# Patient Record
Sex: MALE | Race: WHITE | URBAN - METROPOLITAN AREA
[De-identification: names, ages, dates, MRNs, and addresses within clinical notes are randomized per-mention and may not be internally consistent; named-entity substitution may affect disease eponyms.]

---

## 2017-02-13 ENCOUNTER — TELEPHONE (OUTPATIENT)
Dept: FAMILY MEDICINE | Facility: CLINIC | Age: 27
End: 2017-02-13

## 2017-02-13 DIAGNOSIS — J45.40 MODERATE PERSISTENT ASTHMA: Primary | ICD-10-CM

## 2017-02-13 NOTE — TELEPHONE ENCOUNTER
Ventolin       Last Written Prescription Date: 09/24/2016  Last Fill Quantity: 3, # refills: 1    Last Office Visit with G, P or [facility] prescribing provider:  03/29/2016   Future Office Visit:       Date of Last Asthma Action Plan Letter:   Asthma Action Plan Q1 Year    Topic Date Due     Asthma Action Plan - yearly  07/07/2016      Asthma Control Test:   ACT Total Scores 3/29/2016   ACT TOTAL SCORE -   ASTHMA ER VISITS -   ASTHMA HOSPITALIZATIONS -   ACT TOTAL SCORE (Goal Greater than or Equal to 20) 15   In the past 12 months, how many times did you visit the emergency room for your asthma without being admitted to the hospital? 0   In the past 12 months, how many times were you hospitalized overnight because of your asthma? 0       Date of Last Spirometry Test:   No results found for this or any previous visit.      Waldemar HINSON (R)

## 2017-02-14 RX ORDER — ALBUTEROL SULFATE 90 UG/1
2 AEROSOL, METERED RESPIRATORY (INHALATION) EVERY 6 HOURS PRN
Qty: 1 INHALER | Refills: 0 | Status: SHIPPED | OUTPATIENT
Start: 2017-02-14 | End: 2017-02-19

## 2017-02-19 ENCOUNTER — OFFICE VISIT (OUTPATIENT)
Dept: URGENT CARE | Facility: URGENT CARE | Age: 27
End: 2017-02-19
Payer: COMMERCIAL

## 2017-02-19 VITALS
TEMPERATURE: 99.2 F | SYSTOLIC BLOOD PRESSURE: 122 MMHG | DIASTOLIC BLOOD PRESSURE: 74 MMHG | HEART RATE: 94 BPM | WEIGHT: 158.6 LBS | RESPIRATION RATE: 16 BRPM | OXYGEN SATURATION: 95 % | BODY MASS INDEX: 20.36 KG/M2

## 2017-02-19 DIAGNOSIS — J45.40 MODERATE PERSISTENT ASTHMA WITHOUT COMPLICATION: ICD-10-CM

## 2017-02-19 PROCEDURE — 99214 OFFICE O/P EST MOD 30 MIN: CPT | Performed by: PHYSICIAN ASSISTANT

## 2017-02-19 RX ORDER — ALBUTEROL SULFATE 90 UG/1
2 AEROSOL, METERED RESPIRATORY (INHALATION) EVERY 6 HOURS PRN
Qty: 1 INHALER | Refills: 3 | Status: SHIPPED | OUTPATIENT
Start: 2017-02-19 | End: 2017-09-08

## 2017-02-19 RX ORDER — PREDNISONE 20 MG/1
40 TABLET ORAL DAILY
Qty: 14 TABLET | Refills: 0 | Status: SHIPPED | OUTPATIENT
Start: 2017-02-19 | End: 2017-02-26

## 2017-02-19 ASSESSMENT — ENCOUNTER SYMPTOMS
SENSORY CHANGE: 0
FEVER: 0
LOSS OF CONSCIOUSNESS: 0
MYALGIAS: 0
NECK PAIN: 0
SEIZURES: 0
DIARRHEA: 0
SHORTNESS OF BREATH: 0
BACK PAIN: 0
HEARTBURN: 0
BLOOD IN STOOL: 0
HALLUCINATIONS: 0
NAUSEA: 0
DIZZINESS: 0
SPUTUM PRODUCTION: 0
BLURRED VISION: 0
TINGLING: 0
VOMITING: 0
CONSTIPATION: 0
FOCAL WEAKNESS: 0
NEUROLOGICAL NEGATIVE: 1
PHOTOPHOBIA: 0
WHEEZING: 1
INSOMNIA: 0
NERVOUS/ANXIOUS: 0
DEPRESSION: 0
ORTHOPNEA: 0
COUGH: 1
PALPITATIONS: 0
EYE DISCHARGE: 0
DYSURIA: 0
DIAPHORESIS: 0
DOUBLE VISION: 0
ABDOMINAL PAIN: 0
EYE REDNESS: 0
EYE PAIN: 0
HEADACHES: 0
WEAKNESS: 0
HEMOPTYSIS: 0
FREQUENCY: 0
WEIGHT LOSS: 0
SORE THROAT: 0

## 2017-02-19 ASSESSMENT — LIFESTYLE VARIABLES: SUBSTANCE_ABUSE: 0

## 2017-02-19 NOTE — PROGRESS NOTES
HPI    SUBJECTIVE:                                                    Saúl Cedillo is a 26 year old male who presents to clinic today for follow-up on his asthma. He has run out of his inhaler and has not been using a controlling inhaler for some time. He believes he developed a cold and that has triggered his asthma to worsen over the last few weeks. He's had no fever and has no sputum production.    Problem list and histories reviewed & adjusted, as indicated.  Additional history: as documented    Patient Active Problem List   Diagnosis     Moderate persistent asthma     Hyperlipidemia LDL goal <130     Exposure to tobacco smoke     Allergic rhinitis due to pollen     Past Surgical History   Procedure Laterality Date     Surgical history of -   2009     eye surgery, stigmatism correction       Social History   Substance Use Topics     Smoking status: Never Smoker     Smokeless tobacco: Never Used      Comment: living in smoke filled enviromnent     Alcohol use Yes      Comment: mixed 2 a month     History reviewed. No pertinent family history.      Current Outpatient Prescriptions   Medication Sig Dispense Refill     albuterol (PROAIR HFA/PROVENTIL HFA/VENTOLIN HFA) 108 (90 BASE) MCG/ACT Inhaler Inhale 2 puffs into the lungs every 6 hours as needed for shortness of breath / dyspnea or wheezing 1 Inhaler 3     fluticasone-vilanterol (BREO ELLIPTA) 200-25 MCG/INH oral inhaler Inhale 1 puff into the lungs daily 1 Inhaler 11     predniSONE (DELTASONE) 20 MG tablet Take 2 tablets (40 mg) by mouth daily for 7 days 14 tablet 0     loratadine-pseudoePHEDrine (CLARITIN-D 24-HOUR)  MG per tablet Take 1 tablet by mouth daily 14 tablet      [DISCONTINUED] albuterol (PROAIR HFA/PROVENTIL HFA/VENTOLIN HFA) 108 (90 BASE) MCG/ACT Inhaler Inhale 2 puffs into the lungs every 6 hours as needed for shortness of breath / dyspnea or wheezing (Patient not taking: Reported on 2/19/2017) 1 Inhaler 0     albuterol (2.5 MG/3ML)  0.083% nebulizer solution Take 1 vial (2.5 mg) by nebulization every 6 hours as needed for shortness of breath / dyspnea or wheezing (Patient not taking: Reported on 2/19/2017) 75 mL 0     fluticasone (FLOVENT HFA) 220 MCG/ACT inhaler Inhale 2 puffs into the lungs 2 times daily (Patient not taking: Reported on 2/19/2017) 1 Inhaler 1     No Known Allergies  Problem list, Medication list, Allergies, and Medical/Social/Surgical histories reviewed in [facility] and updated as appropriate.          Review of Systems   Constitutional: Negative for diaphoresis, fever, malaise/fatigue and weight loss.   HENT: Negative for congestion, ear discharge, ear pain, hearing loss, nosebleeds and sore throat.    Eyes: Negative for blurred vision, double vision, photophobia, pain, discharge and redness.   Respiratory: Positive for cough and wheezing. Negative for hemoptysis, sputum production and shortness of breath.    Cardiovascular: Negative for chest pain, palpitations, orthopnea and leg swelling.   Gastrointestinal: Negative for abdominal pain, blood in stool, constipation, diarrhea, heartburn, melena, nausea and vomiting.   Genitourinary: Negative.  Negative for dysuria, frequency and urgency.   Musculoskeletal: Negative for back pain, joint pain, myalgias and neck pain.   Skin: Negative for itching and rash.   Neurological: Negative.  Negative for dizziness, tingling, sensory change, focal weakness, seizures, loss of consciousness, weakness and headaches.   Endo/Heme/Allergies: Negative.    Psychiatric/Behavioral: Negative for depression, hallucinations, substance abuse and suicidal ideas. The patient is not nervous/anxious and does not have insomnia.          Physical Exam   Constitutional: He is oriented to person, place, and time and well-developed, well-nourished, and in no distress.   HENT:   Head: Normocephalic and atraumatic.   Right Ear: External ear normal.   Left Ear: External ear normal.   Nose: Nose normal.    Mouth/Throat: Oropharynx is clear and moist.   Eyes: Conjunctivae and EOM are normal. Pupils are equal, round, and reactive to light. Right eye exhibits no discharge. Left eye exhibits no discharge. No scleral icterus.   Neck: Normal range of motion. Neck supple. No thyromegaly present.   Cardiovascular: Normal rate, regular rhythm, normal heart sounds and intact distal pulses.  Exam reveals no gallop and no friction rub.    No murmur heard.  Pulmonary/Chest: Effort normal. No respiratory distress. He has wheezes. He has no rales. He exhibits no tenderness.   Abdominal: Soft. Bowel sounds are normal. He exhibits no distension and no mass. There is no tenderness. There is no rebound and no guarding.   Musculoskeletal: Normal range of motion. He exhibits no edema or tenderness.   Lymphadenopathy:     He has no cervical adenopathy.   Neurological: He is alert and oriented to person, place, and time. He has normal reflexes. No cranial nerve deficit. He exhibits normal muscle tone. Gait normal. Coordination normal.   Skin: Skin is warm and dry. No rash noted. No erythema.   Psychiatric: Mood, memory, affect and judgment normal.         (J45.40) Moderate persistent asthma without complication  Comment:   Plan: albuterol (PROAIR HFA/PROVENTIL HFA/VENTOLIN         HFA) 108 (90 BASE) MCG/ACT Inhaler,         fluticasone-vilanterol (BREO ELLIPTA) 200-25         MCG/INH oral inhaler, predniSONE (DELTASONE) 20        MG tablet           At this time I refill of his albuterol was given and a prescription for a controlling inhaler as well. Also, a short course of prednisone was prescribed.

## 2017-02-19 NOTE — MR AVS SNAPSHOT
"              After Visit Summary   2017    Saúl Cedillo    MRN: [id number]           Patient Information     Date Of Birth          1990        Visit Information        Provider Department      2017 2:00 PM Jakob Black PA-C [facility]        Today's Diagnoses     Moderate persistent asthma without complication           Follow-ups after your visit        Follow-up notes from your care team     Return if symptoms worsen or fail to improve.      Who to contact     If you have questions or need follow up information about today's clinic visit or your schedule please contact [facility] directly at [phone].  Normal or non-critical lab and imaging results will be communicated to you by MyChart, letter or phone within 4 business days after the clinic has received the results. If you do not hear from us within 7 days, please contact the clinic through [employer] or phone. If you have a critical or abnormal lab result, we will notify you by phone as soon as possible.  Submit refill requests through [employer] or call your pharmacy and they will forward the refill request to us. Please allow 3 business days for your refill to be completed.          Additional Information About Your Visit        MyChart Information     [employer] lets you send messages to your doctor, view your test results, renew your prescriptions, schedule appointments and more. To sign up, go to [email or web address] . Click on \"Log in\" on the left side of the screen, which will take you to the Welcome page. Then click on \"Sign up Now\" on the right side of the page.     You will be asked to enter the access code listed below, as well as some personal information. Please follow the directions to create your username and password.     Your access code is: [email or web address]  Expires: 2017  2:49 PM     Your access code will  in 90 days. If you need help or a new code, " please call your [place] clinic or [phone].        Care EveryWhere ID     This is your Care EveryWhere ID. This could be used by other organizations to access your [place] medical records  [id number]        Your Vitals Were     Pulse Temperature Respirations Pulse Oximetry BMI (Body Mass Index)       94 99.2  F (37.3  C) (Tympanic) 16 95% 20.36 kg/m2        Blood Pressure from Last 3 Encounters:   02/19/17 122/74   09/24/16 121/62   03/29/16 124/70    Weight from Last 3 Encounters:   02/19/17 158 lb 9.6 oz (71.9 kg)   03/29/16 149 lb (67.6 kg)   07/07/15 147 lb (66.7 kg)              Today, you had the following     No orders found for display         Today's Medication Changes          These changes are accurate as of: 2/19/17  2:49 PM.  If you have any questions, ask your nurse or doctor.               Start taking these medicines.        Dose/Directions    fluticasone-vilanterol 200-25 MCG/INH oral inhaler   Commonly known as:  BREO ELLIPTA   Used for:  Moderate persistent asthma without complication   Started by:  Jakob Black PA-C        Dose:  1 puff   Inhale 1 puff into the lungs daily   Quantity:  1 Inhaler   Refills:  11       predniSONE 20 MG tablet   Commonly known as:  DELTASONE   Used for:  Moderate persistent asthma without complication   Started by:  Jakob Black PA-C        Dose:  40 mg   Take 2 tablets (40 mg) by mouth daily for 7 days   Quantity:  14 tablet   Refills:  0            Where to get your medicines      These medications were sent to [facility] [address] [place] [address] - [address]   [address] [facility] [address]     Phone:  [phone]     albuterol 108 (90 BASE) MCG/ACT Inhaler    fluticasone-vilanterol 200-25 MCG/INH oral inhaler    predniSONE 20 MG tablet                Primary Care Provider Office Phone # Fax #    Yumi López [phone]       [facility] [address] [facility] [address]        Thank you!      Thank you for choosing [facility]  for your care. Our goal is always to provide you with excellent care. Hearing back from our patients is one way we can continue to improve our services. Please take a few minutes to complete the written survey that you may receive in the mail after your visit with us. Thank you!             Your Updated Medication List - Protect others around you: Learn how to safely use, store and throw away your medicines at www.disposemymeds.org.          This list is accurate as of: 2/19/17  2:49 PM.  Always use your most recent med list.                   Brand Name Dispense Instructions for use    * albuterol (2.5 MG/3ML) 0.083% neb solution     75 mL    Take 1 vial (2.5 mg) by nebulization every 6 hours as needed for shortness of breath / dyspnea or wheezing       * albuterol 108 (90 BASE) MCG/ACT Inhaler    PROAIR HFA/PROVENTIL HFA/VENTOLIN HFA    1 Inhaler    Inhale 2 puffs into the lungs every 6 hours as needed for shortness of breath / dyspnea or wheezing       fluticasone 220 MCG/ACT Inhaler    FLOVENT HFA    1 Inhaler    Inhale 2 puffs into the lungs 2 times daily       fluticasone-vilanterol 200-25 MCG/INH oral inhaler    BREO ELLIPTA    1 Inhaler    Inhale 1 puff into the lungs daily       loratadine-pseudoePHEDrine  MG per 24 hr tablet    CLARITIN-D 24-hour    14 tablet    Take 1 tablet by mouth daily       predniSONE 20 MG tablet    DELTASONE    14 tablet    Take 2 tablets (40 mg) by mouth daily for 7 days       * Notice:  This list has 2 medication(s) that are the same as other medications prescribed for you. Read the directions carefully, and ask your doctor or other care provider to review them with you.

## 2017-02-27 ENCOUNTER — TELEPHONE (OUTPATIENT)
Dept: FAMILY MEDICINE | Facility: CLINIC | Age: 27
End: 2017-02-27

## 2017-02-27 NOTE — TELEPHONE ENCOUNTER
Mailed patient the ACT on 2/2/17. Called patient today to go over the ACT results. Patient did not answer so I left a voicemail to call back. Will postpone for 1 week.    Verona Gr, CMA

## 2017-02-27 NOTE — LETTER
[facility]  [address] [address]  Phone: [phone]    April 3, 2017    Saúl Cedillo  [address]  [place] [address]              Dear Mr. Cedillo,    Your [place] Care Team works hard to make sure that you and your family receive exceptional care. Enclosed you will find a copy of the Asthma Control Test (ACT) that our clinic uses to monitor and manage your asthma. This test is an assessment tool that we use to determine how well your asthma is controlled.  Please keep a copy of the ACT for your reference when we call you to complete this assessment.   Please complete the enclosed ACT and return in the provided envelope, please keep the previously sent ACT as we may call to review with you in the future.      Sincerely,      Your [facility] Team

## 2017-03-06 NOTE — TELEPHONE ENCOUNTER
Pt was seen in UC on 2/19/17 for asthma related sx. Will postpone x 2 more weeks before contacting patient again.

## 2017-04-03 NOTE — TELEPHONE ENCOUNTER
Panel Management Review      Patient has the following on his problem list:     Asthma review     ACT Total Scores 2/19/2017   ACT TOTAL SCORE -   ASTHMA ER VISITS -   ASTHMA HOSPITALIZATIONS -   ACT TOTAL SCORE (Goal Greater than or Equal to 20) 10   In the past 12 months, how many times did you visit the emergency room for your asthma without being admitted to the hospital? -   In the past 12 months, how many times were you hospitalized overnight because of your asthma? -      1. Is Asthma diagnosis on the Problem List? Yes    2. Is Asthma listed on Health Maintenance? Yes    3. Patient is due for:  ACT and AAP      Composite cancer screening  Chart review shows that this patient is due/due soon for the following None  Summary:    Patient is due/failing the following:   ACT    Action needed:   Patient needs to do ACT.    Type of outreach:    No response to phone calls, letter with ACT mailed for patient to complete and return.  4/3/17    Questions for provider review:    None                                                                                                                                    SHARRI Rodas, CMA

## 2017-08-16 ENCOUNTER — TRANSFERRED RECORDS (OUTPATIENT)
Dept: HEALTH INFORMATION MANAGEMENT | Facility: CLINIC | Age: 27
End: 2017-08-16

## 2017-09-08 DIAGNOSIS — J45.40 MODERATE PERSISTENT ASTHMA WITHOUT COMPLICATION: ICD-10-CM

## 2017-09-08 NOTE — TELEPHONE ENCOUNTER
albuterol (PROAIR HFA/PROVENTIL HFA/VENTOLIN HFA) 108 (90 BASE) MCG/ACT Inhaler       Last Written Prescription Date: 02/19/2017  Last Fill Quantity: 1 inhaler, # refills: 3    Last Office Visit with [facility], P or [facility] prescribing provider:  02/19/2017    Future Office Visit:       Date of Last Asthma Action Plan Letter:   Asthma Action Plan Q1 Year    Topic Date Due     Asthma Action Plan - yearly  07/07/2016      Asthma Control Test:   ACT Total Scores 2/19/2017   ACT TOTAL SCORE -   ASTHMA ER VISITS -   ASTHMA HOSPITALIZATIONS -   ACT TOTAL SCORE (Goal Greater than or Equal to 20) 10   In the past 12 months, how many times did you visit the emergency room for your asthma without being admitted to the hospital? -   In the past 12 months, how many times were you hospitalized overnight because of your asthma? -       Date of Last Spirometry Test:   No results found for this or any previous visit.

## 2017-09-11 RX ORDER — ALBUTEROL SULFATE 90 UG/1
AEROSOL, METERED RESPIRATORY (INHALATION)
Qty: 1 INHALER | Refills: 0 | Status: SHIPPED | OUTPATIENT
Start: 2017-09-11 | End: 2018-01-10

## 2018-01-05 DIAGNOSIS — J45.40 MODERATE PERSISTENT ASTHMA WITHOUT COMPLICATION: ICD-10-CM

## 2018-01-05 RX ORDER — ALBUTEROL SULFATE 90 UG/1
AEROSOL, METERED RESPIRATORY (INHALATION)
Qty: 1 INHALER | Refills: 0 | Status: CANCELLED | OUTPATIENT
Start: 2018-01-05

## 2018-01-05 NOTE — TELEPHONE ENCOUNTER
Reason for Call:  Medication or medication refill:    Do you use a [facility]?  Name of the pharmacy and phone number for the current request:  [facility] [phone]    Name of the medication requested: ventolin inhaler - patient is requesting a refill to get him through until his appointment on Monday.    Other request: none    Can we leave a detailed message on this number? YES    Phone number patient can be reached at: Home number on file [phone] (home)    Best Time: any    Call taken on 1/5/2018 at 4:15 PM by Taylor Mcgarry

## 2018-01-09 NOTE — TELEPHONE ENCOUNTER
Phone answered to a busy signal  REcall    Patient is due for OV for refill on inhaler    Hina BORGES Rn

## 2018-01-10 ENCOUNTER — OFFICE VISIT (OUTPATIENT)
Dept: FAMILY MEDICINE | Facility: CLINIC | Age: 28
End: 2018-01-10
Payer: COMMERCIAL

## 2018-01-10 VITALS
WEIGHT: 154 LBS | TEMPERATURE: 97.7 F | DIASTOLIC BLOOD PRESSURE: 74 MMHG | BODY MASS INDEX: 19.77 KG/M2 | HEART RATE: 86 BPM | SYSTOLIC BLOOD PRESSURE: 122 MMHG | OXYGEN SATURATION: 96 %

## 2018-01-10 DIAGNOSIS — J45.40 MODERATE PERSISTENT ASTHMA WITHOUT COMPLICATION: Primary | ICD-10-CM

## 2018-01-10 PROCEDURE — 99213 OFFICE O/P EST LOW 20 MIN: CPT | Performed by: NURSE PRACTITIONER

## 2018-01-10 RX ORDER — ALBUTEROL SULFATE 90 UG/1
AEROSOL, METERED RESPIRATORY (INHALATION)
Qty: 1 INHALER | Refills: 3 | Status: SHIPPED | OUTPATIENT
Start: 2018-01-10 | End: 2018-09-06

## 2018-01-10 RX ORDER — ALBUTEROL SULFATE 0.83 MG/ML
1 SOLUTION RESPIRATORY (INHALATION) EVERY 6 HOURS PRN
Qty: 75 ML | Refills: 0 | Status: SHIPPED | OUTPATIENT
Start: 2018-01-10 | End: 2019-07-18

## 2018-01-10 ASSESSMENT — ASTHMA QUESTIONNAIRES
QUESTION_3 LAST FOUR WEEKS HOW OFTEN DID YOUR ASTHMA SYMPTOMS (WHEEZING, COUGHING, SHORTNESS OF BREATH, CHEST TIGHTNESS OR PAIN) WAKE YOU UP AT NIGHT OR EARLIER THAN USUAL IN THE MORNING: NOT AT ALL
QUESTION_4 LAST FOUR WEEKS HOW OFTEN HAVE YOU USED YOUR RESCUE INHALER OR NEBULIZER MEDICATION (SUCH AS ALBUTEROL): TWO OR THREE TIMES PER WEEK
QUESTION_2 LAST FOUR WEEKS HOW OFTEN HAVE YOU HAD SHORTNESS OF BREATH: ONCE OR TWICE A WEEK
QUESTION_1 LAST FOUR WEEKS HOW MUCH OF THE TIME DID YOUR ASTHMA KEEP YOU FROM GETTING AS MUCH DONE AT WORK, SCHOOL OR AT HOME: A LITTLE OF THE TIME
QUESTION_5 LAST FOUR WEEKS HOW WOULD YOU RATE YOUR ASTHMA CONTROL: SOMEWHAT CONTROLLED
ACT_TOTALSCORE: 19

## 2018-01-10 NOTE — MR AVS SNAPSHOT
After Visit Summary   1/10/2018    Saúl Cedillo    MRN: [id number]           Patient Information     Date Of Birth          1990        Visit Information        Provider Department      1/10/2018 6:40 AM Mame Madrigal APRN [facility]        Today's Diagnoses     Moderate persistent asthma without complication    -  1      Care Instructions          Thank you for choosing [facility].  You may be receiving a survey in the mail from [employer] regarding your visit today.  Please take a few minutes to complete and return the survey to let us know how we are doing.      If you have questions or concerns, please contact us via [employer] or you can contact your care team at [phone].    Our Clinic hours are:  Monday 6:40 am  to 7:00 pm  Tuesday -Friday 6:40 am to 5:00 pm    The [place] outpatient lab hours are:  Monday - Friday 6:10 am to 4:45 pm  Saturdays 7:00 am to 11:00 am  Appointments are required, call [phone]    If you have clinical questions after hours or would like to schedule an appointment,  call the clinic at [phone].          Follow-ups after your visit        Who to contact     If you have questions or need follow up information about today's clinic visit or your schedule please contact [facility] directly at [phone].  Normal or non-critical lab and imaging results will be communicated to you by B[employer]hart, letter or phone within 4 business days after the clinic has received the results. If you do not hear from us within 7 days, please contact the clinic through [employer] or phone. If you have a critical or abnormal lab result, we will notify you by phone as soon as possible.  Submit refill requests through [employer] or call your pharmacy and they will forward the refill request to us. Please allow 3 business days for your refill to be completed.          Additional Information About Your Visit        MyChart Information      "[employer] lets you send messages to your doctor, view your test results, renew your prescriptions, schedule appointments and more. To sign up, go to [email or web address] . Click on \"Log in\" on the left side of the screen, which will take you to the Welcome page. Then click on \"Sign up Now\" on the right side of the page.     You will be asked to enter the access code listed below, as well as some personal information. Please follow the directions to create your username and password.     Your access code is: [email or web address]  Expires: 4/10/2018  7:13 AM     Your access code will  in 90 days. If you need help or a new code, please call your [place] clinic or [phone].        Care EveryWhere ID     This is your Care EveryWhere ID. This could be used by other organizations to access your [place] medical records  [id number]        Your Vitals Were     Pulse Temperature Pulse Oximetry BMI (Body Mass Index)          86 97.7  F (36.5  C) (Tympanic) 96% 19.77 kg/m2         Blood Pressure from Last 3 Encounters:   01/10/18 122/74   17 122/74   16 121/62    Weight from Last 3 Encounters:   01/10/18 154 lb (69.9 kg)   17 158 lb 9.6 oz (71.9 kg)   16 149 lb (67.6 kg)              Today, you had the following     No orders found for display         Today's Medication Changes          These changes are accurate as of: 1/10/18  7:13 AM.  If you have any questions, ask your nurse or doctor.               These medicines have changed or have updated prescriptions.        Dose/Directions    * albuterol (2.5 MG/3ML) 0.083% neb solution   This may have changed:  Another medication with the same name was changed. Make sure you understand how and when to take each.   Used for:  Moderate persistent asthma without complication   Changed by:  Mame Madrigal APRN CNP        Dose:  1 vial   Take 1 vial (2.5 mg) by nebulization every 6 hours as needed for shortness of breath / dyspnea or wheezing "   Quantity:  75 mL   Refills:  0       * albuterol 108 (90 BASE) MCG/ACT Inhaler   Commonly known as:  VENTOLIN HFA   This may have changed:  See the new instructions.   Used for:  Moderate persistent asthma without complication   Changed by:  Mame Madrigal APRN CNP        INHALE TWO PUFFS INTO THE LUNGS EVERY 6 HOURS AS NEEDED FOR SHORTNESS OF BREATH/DYSPNEA OR WHEEZING   Quantity:  1 Inhaler   Refills:  3       * Notice:  This list has 2 medication(s) that are the same as other medications prescribed for you. Read the directions carefully, and ask your doctor or other care provider to review them with you.      Stop taking these medicines if you haven't already. Please contact your care team if you have questions.     fluticasone 220 MCG/ACT Inhaler   Commonly known as:  FLOVENT HFA   Stopped by:  Mame Madrigal APRN CNP                Where to get your medicines      These medications were sent to [facility] [address] [facility]  [address] [place] [address]     Phone:  [phone]     albuterol (2.5 MG/3ML) 0.083% neb solution    albuterol 108 (90 BASE) MCG/ACT Inhaler    fluticasone-vilanterol 200-25 MCG/INH oral inhaler                Primary Care Provider Office Phone # Fax #    Yumi Nicole López [phone] [address] [facility] [address]        Equal Access to Services     CHELSEA [facility] Hospital[facility] AH: Hadii aad ku hadasho Soomaali, waaxda luqadaha, qaybta kaalmada adeegyada, ludwin rodrigez hayac hargrove. So [facility] [phone].    ATENCIÓN: Si habla español, tiene a rivera disposición servicios gratuitos de asistencia lingüística. Nazia al [phone].    We comply with applicable federal civil rights laws and [place] laws. We do not discriminate on the basis of race, color, national origin, age, disability, sex, sexual orientation, or gender identity.            Thank you!     Thank you for choosing [facility]  for your  care. Our goal is always to provide you with excellent care. Hearing back from our patients is one way we can continue to improve our services. Please take a few minutes to complete the written survey that you may receive in the mail after your visit with us. Thank you!             Your Updated Medication List - Protect others around you: Learn how to safely use, store and throw away your medicines at www.disposemymeds.org.          This list is accurate as of: 1/10/18  7:13 AM.  Always use your most recent med list.                   Brand Name Dispense Instructions for use Diagnosis    * albuterol (2.5 MG/3ML) 0.083% neb solution     75 mL    Take 1 vial (2.5 mg) by nebulization every 6 hours as needed for shortness of breath / dyspnea or wheezing    Moderate persistent asthma without complication       * albuterol 108 (90 BASE) MCG/ACT Inhaler    VENTOLIN HFA    1 Inhaler    INHALE TWO PUFFS INTO THE LUNGS EVERY 6 HOURS AS NEEDED FOR SHORTNESS OF BREATH/DYSPNEA OR WHEEZING    Moderate persistent asthma without complication       fluticasone-vilanterol 200-25 MCG/INH oral inhaler    BREO ELLIPTA    1 Inhaler    Inhale 1 puff into the lungs daily    Moderate persistent asthma without complication       loratadine-pseudoePHEDrine  MG per 24 hr tablet    CLARITIN-D 24-hour    14 tablet    Take 1 tablet by mouth daily        * Notice:  This list has 2 medication(s) that are the same as other medications prescribed for you. Read the directions carefully, and ask your doctor or other care provider to review them with you.

## 2018-01-10 NOTE — NURSING NOTE
"Chief Complaint   Patient presents with     Asthma     inhaler refill       Initial /74 (BP Location: Left arm, Patient Position: Sitting, Cuff Size: Adult Regular)  Pulse 86  Temp 97.7  F (36.5  C) (Tympanic)  Wt 154 lb (69.9 kg)  SpO2 96%  BMI 19.77 kg/m2 Estimated body mass index is 19.77 kg/(m^2) as calculated from the following:    Height as of 3/29/16: 6' 2\" (1.88 m).    Weight as of this encounter: 154 lb (69.9 kg).  Medication Reconciliation: complete    "

## 2018-01-10 NOTE — PATIENT INSTRUCTIONS
Thank you for choosing [facility].  You may be receiving a survey in the mail from Jerzy Iyer regarding your visit today.  Please take a few minutes to complete and return the survey to let us know how we are doing.      If you have questions or concerns, please contact us via [employer] or you can contact your care team at [phone].    Our Clinic hours are:  Monday 6:40 am  to 7:00 pm  Tuesday -Friday 6:40 am to 5:00 pm    The [place] outpatient lab hours are:  Monday - Friday 6:10 am to 4:45 pm  Saturdays 7:00 am to 11:00 am  Appointments are required, call [phone]    If you have clinical questions after hours or would like to schedule an appointment,  call the clinic at [phone].

## 2018-01-10 NOTE — PROGRESS NOTES
SUBJECTIVE:   Saúl Cedillo is a 27 year old male who presents to clinic today for the following health issues:    Asthma Follow-Up    Was ACT completed today?    Yes    ACT Total Scores 1/10/2018   ACT TOTAL SCORE -   ASTHMA ER VISITS -   ASTHMA HOSPITALIZATIONS -   ACT TOTAL SCORE (Goal Greater than or Equal to 20) 19   In the past 12 months, how many times did you visit the emergency room for your asthma without being admitted to the hospital? 0   In the past 12 months, how many times were you hospitalized overnight because of your asthma? 0       Recent asthma triggers that patient is dealing with: dust mites    Amount of exercise or physical activity: 4-5 days/week for an average of 30-45 minutes    Problems taking medications regularly: No    Medication side effects: none  Diet: regular (no restrictions)  Patient states that he has not used in Breo Ellipta as he was not able to buy the prescription. He is waiting for new insurance.   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       -------------------------------------    Problem list and histories reviewed & adjusted, as indicated.  Additional history: as documented    Patient Active Problem List   Diagnosis     Moderate persistent asthma     Hyperlipidemia LDL goal <130     Exposure to tobacco smoke     Allergic rhinitis due to pollen     Past Surgical History:   Procedure Laterality Date     SURGICAL HISTORY OF -   2009    eye surgery, stigmatism correction       Social History   Substance Use Topics     Smoking status: Never Smoker     Smokeless tobacco: Never Used      Comment: living in smoke filled enviromnent     Alcohol use Yes      Comment: mixed 2 a month     History reviewed. No pertinent family history.          Reviewed and updated as needed this visit by clinical staffTobacco  Allergies  Med Hx  Surg Hx  Fam Hx  Soc Hx      Reviewed and updated as needed this visit by Provider         ROS:  Constitutional,  HEENT, cardiovascular, pulmonary, GI, , musculoskeletal, neuro, skin, endocrine and psych systems are negative, except as otherwise noted.      OBJECTIVE:   /74 (BP Location: Left arm, Patient Position: Sitting, Cuff Size: Adult Regular)  Pulse 86  Temp 97.7  F (36.5  C) (Tympanic)  Wt 154 lb (69.9 kg)  SpO2 96%  BMI 19.77 kg/m2  Body mass index is 19.77 kg/(m^2).  GENERAL: healthy, alert and no distress  RESP: lungs clear to auscultation - no rales, rhonchi or wheezes  CV: regular rate and rhythm, normal S1 S2, no S3 or S4, no murmur, click or rub,   MS: no gross musculoskeletal defects noted, no edema    Diagnostic Test Results:  none     ASSESSMENT/PLAN:       1. Moderate persistent asthma without complication  Uncontrolled currently- he is in process of getting insurance and has been without his Breo Ellipta inhaler  - he is working with pharmacy to get this medication   - using albuterol prn   - will need to redo ACT since he scored low today due to not taking Breo-Ellipta     PAT Denis [facility]

## 2018-01-11 ASSESSMENT — ASTHMA QUESTIONNAIRES: ACT_TOTALSCORE: 19

## 2018-03-19 ENCOUNTER — TELEPHONE (OUTPATIENT)
Dept: FAMILY MEDICINE | Facility: CLINIC | Age: 28
End: 2018-03-19

## 2018-03-19 NOTE — TELEPHONE ENCOUNTER
Patient is due to update ACT, last done 1/10/18 with a score of 19.  Moderate persistent asthma.    Per 1/10/18 office visit:    1. Moderate persistent asthma without complication  Uncontrolled currently- he is in process of getting insurance and has been without his Breo Ellipta inhaler  - he is working with pharmacy to get this medication   - using albuterol prn   - will need to redo ACT since he scored low today due to not taking Breo-Ellipta     Letter with ACT mailed to patient.  Will call to review.

## 2018-03-19 NOTE — LETTER
March 19, 2018      Saúl Cedillo  [address]  [place] [address]        Dear Saúl,     Your [facility] Team works hard to make sure that you and your family receive exceptional care. Enclosed you will find a copy of the Asthma Control Test (ACT) that our clinic uses to monitor and manage your asthma. This test is an assessment tool that we use to determine how well your asthma is controlled.  Please keep a copy of the ACT for your reference when we call you to complete this assessment.     We will call within the next 2 weeks to review the questionnaire.    Thank you for trusting us with your health care.        Sincerely,      Your [facility] Team/ramin

## 2018-03-19 NOTE — LETTER
April 4, 2018      Saúl Cedillo  [address]  [place] [address]        Dear Saúl,     We have been unable to reach you by phone Your [facility] Team works hard to make sure that you  receive exceptional care. Enclosed you will find a copy of the Asthma Control Test (ACT) that our clinic uses to monitor and manage your asthma. This test is an assessment tool that we can use to determine how well your asthma is controlled. Please fill out and mail back the enclosed ACT form. Enclosed is a stamped addressed envelope for you to mail the ACT back to the clinic in. Also, please  keep the ACT that was previously mailed to you  for your reference when we call you in the future  to complete this assessment.            Sincerely,        PAT Denis CNP/cb

## 2018-03-28 NOTE — TELEPHONE ENCOUNTER
Tried calling patient , no answer, voicemail box full. Will try again later. Brian BORGES CMA

## 2018-04-04 NOTE — TELEPHONE ENCOUNTER
Panel Management Review      Patient has the following on his problem list:     Asthma review     ACT Total Scores 1/10/2018   ACT TOTAL SCORE -   ASTHMA ER VISITS -   ASTHMA HOSPITALIZATIONS -   ACT TOTAL SCORE (Goal Greater than or Equal to 20) 19   In the past 12 months, how many times did you visit the emergency room for your asthma without being admitted to the hospital? 0   In the past 12 months, how many times were you hospitalized overnight because of your asthma? 0      1. Is Asthma diagnosis on the Problem List? Yes    2. Is Asthma listed on Health Maintenance? Yes    3. Patient is due for:  ACT      Composite cancer screening  Chart review shows that this patient is due/due soon for the following None  Summary:    Patient is due/failing the following:   ACT    Action needed:   Patient needs to do ACT.    Type of outreach:    Sent letter. with ACT to fill out and mail back     Questions for provider review:    None                                                                                                                                    Brian BORGES CMA

## 2018-07-03 ENCOUNTER — TELEPHONE (OUTPATIENT)
Dept: FAMILY MEDICINE | Facility: CLINIC | Age: 28
End: 2018-07-03

## 2018-07-03 NOTE — LETTER
July 24, 2018      Saúl Cedillo  [address]  [place] [address]        Dear Saúl,     Your [facility] Team works hard to make sure that you and your family receive exceptional care. Enclosed you will find a copy of the Asthma Control Test (ACT) that our clinic uses to monitor and manage your asthma. This test is an assessment tool that we use to determine how well your asthma is controlled.  Please complete the enclosed form and return in the provided envelope.    Thank you for trusting us with your health care.    Sincerely,        Your [facility] Team/ramni

## 2018-07-03 NOTE — LETTER
July 3, 2018      Saúl Cedillo  [address]  [place] [address]        Dear Saúl,     Your [facility] Team works hard to make sure that you and your family receive exceptional care. Enclosed you will find a copy of the Asthma Control Test (ACT) that our clinic uses to monitor and manage your asthma. This test is an assessment tool that we use to determine how well your asthma is controlled.  Please keep a copy of the ACT for your reference when we call you to complete this assessment.     We will call within the next 2 weeks to review the questionnaire.    Thank you for trusting us with your health care.    Sincerely,        Your [facility] Team/ramin

## 2018-07-03 NOTE — TELEPHONE ENCOUNTER
Patient is due to update ACT, last done 1/10/18 with a score of 19.  Moderate persistent asthma.     Per 1/10/18 office visit:     1. Moderate persistent asthma without complication  Uncontrolled currently- he is in process of getting insurance and has been without his Breo Ellipta inhaler  - he is working with pharmacy to get this medication   - using albuterol prn   - will need to redo ACT since he scored low today due to not taking Breo-Ellipta     Last panel management 3/19/18.     Letter with ACT mailed to patient.  Will call to review.

## 2018-07-24 NOTE — TELEPHONE ENCOUNTER
Panel Management Review      Patient has the following on his problem list:     Asthma review     ACT Total Scores 1/10/2018   ACT TOTAL SCORE -   ASTHMA ER VISITS -   ASTHMA HOSPITALIZATIONS -   ACT TOTAL SCORE (Goal Greater than or Equal to 20) 19   In the past 12 months, how many times did you visit the emergency room for your asthma without being admitted to the hospital? 0   In the past 12 months, how many times were you hospitalized overnight because of your asthma? 0      1. Is Asthma diagnosis on the Problem List? Yes    2. Is Asthma listed on Health Maintenance? Yes    3. Patient is due for:  ACT      Composite cancer screening  Chart review shows that this patient is due/due soon for the following None  Summary:    Patient is due/failing the following:   ACT    Action needed:   Patient needs to do ACT.    Type of outreach:    No response to phone calls, letter mailed with ACT for patient to complete and return.    Questions for provider review:    None                                                                                                                                    SHARRI Rodas MA

## 2018-09-06 DIAGNOSIS — J45.40 MODERATE PERSISTENT ASTHMA WITHOUT COMPLICATION: ICD-10-CM

## 2018-09-06 NOTE — LETTER
Saúl Cedillo  [address] [facility] [address]          Dear Saúl,      We refilled the inhalers for you but you will need an office visit with your provider for further refills.  Please call [phone] to set this up soon.      Thank you,          Your care team, Chana ARIZMENDI RN

## 2018-09-07 RX ORDER — ALBUTEROL SULFATE 90 UG/1
AEROSOL, METERED RESPIRATORY (INHALATION)
Qty: 1 INHALER | Refills: 3 | Status: SHIPPED | OUTPATIENT
Start: 2018-09-07 | End: 2018-09-28

## 2018-09-07 NOTE — TELEPHONE ENCOUNTER
"Due for clinic visit. 30 day jules fill given.    Attempted to contact patient. No answer/voicemail full.      Rosina SUN RN      Requested Prescriptions   Pending Prescriptions Disp Refills     fluticasone-vilanterol (BREO ELLIPTA) 200-25 MCG/INH inhaler 1 Inhaler 3     Sig: Inhale 1 puff into the lungs daily    Inhaled Steroids Protocol Failed    9/6/2018  8:29 PM       Failed - Asthma control assessment score within normal limits in last 6 months    Please review ACT score.          Failed - Recent (6 mo) or future (30 days) visit within the authorizing provider's specialty    Patient had office visit in the last 6 months or has a visit in the next 30 days with authorizing provider or within the authorizing provider's specialty.  See \"Patient Info\" tab in inbasket, or \"Choose Columns\" in Meds & Orders section of the refill encounter.           Passed - Patient is age 12 or older        albuterol (VENTOLIN HFA) 108 (90 Base) MCG/ACT inhaler 1 Inhaler 3     Sig: INHALE TWO PUFFS INTO THE LUNGS EVERY 6 HOURS AS NEEDED FOR SHORTNESS OF BREATH/DYSPNEA OR WHEEZING    Asthma Maintenance Inhalers - Anticholinergics Failed    9/6/2018  8:29 PM       Failed - Asthma control assessment score within normal limits in last 6 months    Please review ACT score.          Failed - Recent (6 mo) or future (30 days) visit within the authorizing provider's specialty    Patient had office visit in the last 6 months or has a visit in the next 30 days with authorizing provider or within the authorizing provider's specialty.  See \"Patient Info\" tab in inbasket, or \"Choose Columns\" in Meds & Orders section of the refill encounter.           Passed - Patient is age 12 years or older          "

## 2018-09-10 NOTE — TELEPHONE ENCOUNTER
I have attempted to contact this patient by phone with the following results: 'the mailbox is full and cannot accept any messages.'    Katherine HERNANDEZ RN

## 2018-09-11 NOTE — TELEPHONE ENCOUNTER
Attempted to call the patient and mailbox is still full.  I have checked with pharmacy and the patient did  the inhalers  I have mailed a letter asking for him to schedule an appt soon. Chana ARIZMENDI RN

## 2018-09-28 ENCOUNTER — OFFICE VISIT (OUTPATIENT)
Dept: FAMILY MEDICINE | Facility: CLINIC | Age: 28
End: 2018-09-28
Payer: COMMERCIAL

## 2018-09-28 VITALS
TEMPERATURE: 97.4 F | DIASTOLIC BLOOD PRESSURE: 70 MMHG | OXYGEN SATURATION: 99 % | SYSTOLIC BLOOD PRESSURE: 102 MMHG | BODY MASS INDEX: 19.92 KG/M2 | HEIGHT: 73 IN | HEART RATE: 85 BPM | WEIGHT: 150.3 LBS

## 2018-09-28 DIAGNOSIS — R53.83 FATIGUE, UNSPECIFIED TYPE: ICD-10-CM

## 2018-09-28 DIAGNOSIS — J02.9 ACUTE PHARYNGITIS, UNSPECIFIED ETIOLOGY: Primary | ICD-10-CM

## 2018-09-28 DIAGNOSIS — T14.8XXA ABRASION OF SKIN WITH INFECTION: ICD-10-CM

## 2018-09-28 DIAGNOSIS — R19.7 DIARRHEA OF PRESUMED INFECTIOUS ORIGIN: ICD-10-CM

## 2018-09-28 DIAGNOSIS — L08.9 ABRASION OF SKIN WITH INFECTION: ICD-10-CM

## 2018-09-28 DIAGNOSIS — J45.40 MODERATE PERSISTENT ASTHMA WITHOUT COMPLICATION: ICD-10-CM

## 2018-09-28 DIAGNOSIS — R10.84 ABDOMINAL PAIN, GENERALIZED: ICD-10-CM

## 2018-09-28 LAB
DEPRECATED S PYO AG THROAT QL EIA: NORMAL
SPECIMEN SOURCE: NORMAL

## 2018-09-28 PROCEDURE — 87081 CULTURE SCREEN ONLY: CPT | Performed by: NURSE PRACTITIONER

## 2018-09-28 PROCEDURE — 99214 OFFICE O/P EST MOD 30 MIN: CPT | Performed by: NURSE PRACTITIONER

## 2018-09-28 PROCEDURE — 87880 STREP A ASSAY W/OPTIC: CPT | Performed by: NURSE PRACTITIONER

## 2018-09-28 RX ORDER — MUPIROCIN 20 MG/G
OINTMENT TOPICAL 3 TIMES DAILY
Qty: 30 G | Refills: 0 | Status: SHIPPED | OUTPATIENT
Start: 2018-09-28 | End: 2018-10-05

## 2018-09-28 RX ORDER — ONDANSETRON 4 MG/1
4 TABLET, FILM COATED ORAL EVERY 8 HOURS PRN
Qty: 15 TABLET | Refills: 1 | Status: SHIPPED | OUTPATIENT
Start: 2018-09-28

## 2018-09-28 RX ORDER — ALBUTEROL SULFATE 90 UG/1
AEROSOL, METERED RESPIRATORY (INHALATION)
Qty: 1 INHALER | Refills: 3 | Status: SHIPPED | OUTPATIENT
Start: 2018-09-28 | End: 2019-03-21

## 2018-09-28 NOTE — MR AVS SNAPSHOT
After Visit Summary   9/28/2018    Saúl Cedillo    MRN: [id number]           Patient Information     Date Of Birth          1990        Visit Information        Provider Department      9/28/2018 2:20 PM Carol Morrell APRN CNP [facility]        Today's Diagnoses     Acute pharyngitis, unspecified etiology    -  1    Fatigue, unspecified type        Diarrhea of presumed infectious origin        Moderate persistent asthma without complication        Abrasion of skin with infection        Abdominal pain, generalized          Care Instructions    For skin sores apply Bactroban cream 3 times daily for 7 days, may repeat treatment as needed.  Strep test is negative  Abdominal cramping, diarrhea and nausea due to viral gastroenteritis  Viral infection can cause fatigue.    Recommend symptomatic treatment with Imodium 2 mg as needed after each loose stool, maximum 16 mg per day  Drink more fluids    Zofran as needed for nausea    BRAT diet: bananas, rice, fluids, toasts and apple sauce.     If symptoms don't improve recommend additional evaluation stool testing, liver function check, CBC, renal function    If you continue to have symptoms without improvement schedule lab appointment                  Follow-ups after your visit        Who to contact     If you have questions or need follow up information about today's clinic visit or your schedule please contact [facility] directly at [phone].  Normal or non-critical lab and imaging results will be communicated to you by MyChart, letter or phone within 4 business days after the clinic has received the results. If you do not hear from us within 7 days, please contact the clinic through MyChart or phone. If you have a critical or abnormal lab result, we will notify you by phone as soon as possible.  Submit refill requests through [employer] or call your pharmacy and they will forward the refill request to us.  "Please allow 3 business days for your refill to be completed.          Additional Information About Your Visit        MyChart Information     [employer] lets you send messages to your doctor, view your test results, renew your prescriptions, schedule appointments and more. To sign up, go to [email or web address] . Click on \"Log in\" on the left side of the screen, which will take you to the Welcome page. Then click on \"Sign up Now\" on the right side of the page.     You will be asked to enter the access code listed below, as well as some personal information. Please follow the directions to create your username and password.     Your access code is: [email or web address]  Expires: 2018  2:47 PM     Your access code will  in 90 days. If you need help or a new code, please call your [facility] clinic or [phone].        Care EveryWhere ID     This is your Care EveryWhere ID. This could be used by other organizations to access your [facility] medical records  [id number]        Your Vitals Were     Pulse Temperature Height Pulse Oximetry BMI (Body Mass Index)       85 97.4  F (36.3  C) (Tympanic) 6' 1.25\" (1.861 m) 99% 19.69 kg/m2        Blood Pressure from Last 3 Encounters:   18 102/70   01/10/18 122/74   17 122/74    Weight from Last 3 Encounters:   18 150 lb 4.8 oz (68.2 kg)   01/10/18 154 lb (69.9 kg)   17 158 lb 9.6 oz (71.9 kg)              We Performed the Following     Beta strep group A culture     Rapid strep screen          Today's Medication Changes          These changes are accurate as of 18  2:47 PM.  If you have any questions, ask your nurse or doctor.               Start taking these medicines.        Dose/Directions    mupirocin 2 % ointment   Commonly known as:  BACTROBAN   Used for:  Abrasion of skin with infection   Started by:  Carol Morrell APRN CNP        Apply topically 3 times daily for 7 days To affected areas of your skin   Quantity:  30 g   Refills: "  0            Where to get your medicines      These medications were sent to [facility] [place] - [place], [place] - [address]  [address] [address]     Phone:  [phone]     albuterol 108 (90 Base) MCG/ACT inhaler    fluticasone-vilanterol 200-25 MCG/INH inhaler    mupirocin 2 % ointment                Primary Care Provider Office Phone # Fax #    Yumi Nicole López [phone] [address] [facility] [address]        Equal Access to Services     : Hadii aad ku hadasho Soomaali, waaxda luqadaha, qaybta kaalmada adeegyada, ludwin boles . [facility] [place] -[phone].    ATENCIÓN: Si habla español, tiene a rivera disposición servicios gratuitos de asistencia lingüística. [facility] [phone].    We comply with applicable federal civil rights laws and [place] laws. We do not discriminate on the basis of race, color, national origin, age, disability, sex, sexual orientation, or gender identity.            Thank you!     Thank you for choosing [facility]  for your care. Our goal is always to provide you with excellent care. Hearing back from our patients is one way we can continue to improve our services. Please take a few minutes to complete the written survey that you may receive in the mail after your visit with us. Thank you!             Your Updated Medication List - Protect others around you: Learn how to safely use, store and throw away your medicines at www.disposemymeds.org.          This list is accurate as of 9/28/18  2:47 PM.  Always use your most recent med list.                   Brand Name Dispense Instructions for use Diagnosis    * albuterol (2.5 MG/3ML) 0.083% neb solution     75 mL    Take 1 vial (2.5 mg) by nebulization every 6 hours as needed for shortness of breath / dyspnea or wheezing    Moderate persistent asthma without complication       * albuterol 108 (90 Base) MCG/ACT inhaler    VENTOLIN  HFA    1 Inhaler    INHALE TWO PUFFS INTO THE LUNGS EVERY 6 HOURS AS NEEDED FOR SHORTNESS OF BREATH/DYSPNEA OR WHEEZING    Moderate persistent asthma without complication       fluticasone-vilanterol 200-25 MCG/INH inhaler    BREO ELLIPTA    1 Inhaler    Inhale 1 puff into the lungs daily    Moderate persistent asthma without complication       loratadine-pseudoePHEDrine  MG per 24 hr tablet    CLARITIN-D 24-hour    14 tablet    Take 1 tablet by mouth daily        mupirocin 2 % ointment    BACTROBAN    30 g    Apply topically 3 times daily for 7 days To affected areas of your skin    Abrasion of skin with infection       * Notice:  This list has 2 medication(s) that are the same as other medications prescribed for you. Read the directions carefully, and ask your doctor or other care provider to review them with you.

## 2018-09-28 NOTE — PROGRESS NOTES
SUBJECTIVE:   Saúl Cedillo is a 28 year old male who presents to clinic today for the following health issues: asthma follow up and several other complains. States asthma symptoms under control, rarely using rescue inhaler, denies shortness of breath and wheezing, complains of mild cough that started about a week ago. Also, complains of generalized abdominal cramping pain, nausea, bloating, diarrhea started this morning, feeling feverish, chills, sweats and fatigue. Also sore throat for about a week.    Complains of multiple small skin abrasions on his hands and arms with redness and states he noted some pussy discharge. 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     Chief Complaint   Patient presents with     Abdominal Pain     xone day      Refill Request     inhalers, pending      Fatigue     Has been feeling fatigued the past two weeks     Throat Problem     Has had a sore throat for one week        ABDOMINAL PAIN     Onset: one day     Description:   Character: Sharp  Location: right upper quadrant left upper quadrant  Radiation: None    Intensity: 3/10    Progression of Symptoms:  improving    Accompanying Signs & Symptoms:  Fever/Chills?: YES, chills   Gas/Bloating: YES, gas   Nausea: YES   Vomitting: no   Diarrhea?: YES- started this morning   Constipation:no   Dysuria or Hematuria: no    History:   Trauma: no   Previous similar pain: States he has had stomach cramps in the past   Previous tests done: none    Precipitating factors:    Does the pain change with:     Food: no      BM: no     Urination: no     Alleviating factors:  None     Therapies Tried and outcome:  Aleve       LMP:  not applicable     Problem list and histories reviewed & adjusted, as indicated.  Additional history: as documented    Labs reviewed in EPIC    Reviewed and updated as needed this visit by clinical staff  Tobacco  Allergies  Meds  Med Hx  Surg Hx  Fam Hx  Soc Hx      Reviewed and  "updated as needed this visit by Provider         ROS:  Constitutional, HEENT, cardiovascular, pulmonary, gi and gu systems are negative, except as otherwise noted.    OBJECTIVE:     /70  Pulse 85  Temp 97.4  F (36.3  C) (Tympanic)  Ht 6' 1.25\" (1.861 m)  Wt 150 lb 4.8 oz (68.2 kg)  SpO2 99%  BMI 19.69 kg/m2  Body mass index is 19.69 kg/(m^2).  GENERAL: healthy, alert and no distress  EYES: Eyes grossly normal to inspection, PERRL and conjunctivae and sclerae normal  HENT: normal cephalic/atraumatic, right ear: erythematous, left ear: normal TM mobility on insufflation, nose and mouth without ulcers or lesions, oral mucous membranes moist and tonsillar erythema  NECK: bilateral anterior cervical adenopathy  RESP:occasional soft expiratory wheezes bilaterally   CV: regular rate and rhythm, normal S1 S2, no S3 or S4, no murmur, click or rub, no peripheral edema and peripheral pulses strong  ABDOMEN: soft, nontender, no organomegaly or masses and bowel sounds hyperactive  MS: no gross musculoskeletal defects noted, no edema  PSYCH: mentation appears normal, affect normal/bright    Diagnostic Test Results:  Results for orders placed or performed in visit on 09/28/18 (from the past 24 hour(s))   Rapid strep screen   Result Value Ref Range    Specimen Description Throat     Rapid Strep A Screen       NEGATIVE: No Group A streptococcal antigen detected by immunoassay, await culture report.       ASSESSMENT/PLAN:     1. Acute pharyngitis, unspecified etiology  -discussed symptomatic treatment   - Rapid strep screen  - Beta strep group A culture    2. Fatigue, unspecified type  -more likely related to current viral illness, gastroenteritis  -if no improvement in 3-5 days recommended additional evaluation: CBC, LFT's, BMP and stool testing   - CBC with platelets differential; Future    3. Diarrhea of presumed infectious origin  --symptoms consistent with viral gastroenteritis  -discussed symptomatic treatment  -if " no improvement recommended additional evaluation   - Enteric Bacteria and Virus Panel by CASSANDRA Stool; Future    4. Moderate persistent asthma without complication  -stable, well controlled   - albuterol (VENTOLIN HFA) 108 (90 Base) MCG/ACT inhaler; INHALE TWO PUFFS INTO THE LUNGS EVERY 6 HOURS AS NEEDED FOR SHORTNESS OF BREATH/DYSPNEA OR WHEEZING  Dispense: 1 Inhaler; Refill: 3  - fluticasone-vilanterol (BREO ELLIPTA) 200-25 MCG/INH inhaler; Inhale 1 puff into the lungs daily  Dispense: 1 Inhaler; Refill: 5    5. Abrasion of skin with infection  - mupirocin (BACTROBAN) 2 % ointment; Apply topically 3 times daily for 7 days To affected areas of your skin  Dispense: 30 g; Refill: 0    6. Abdominal pain, generalized  - ondansetron (ZOFRAN) 4 MG tablet; Take 1 tablet (4 mg) by mouth every 8 hours as needed for nausea  Dispense: 15 tablet; Refill: 1  - Comprehensive metabolic panel (BMP + Alb, Alk Phos, ALT, AST, Total. Bili, TP); Future    See Patient Instructions    PAT Savage [facility]

## 2018-09-28 NOTE — LETTER
October 1, 2018      Saúl Cedillo  [address]  [place] [address]            The results of your recent throat culture were negative.  If you have any further questions or concerns please contact the clinic            Sincerely,        PAT Savage CNP/ls

## 2018-09-28 NOTE — PATIENT INSTRUCTIONS
For skin sores apply Bactroban cream 3 times daily for 7 days, may repeat treatment as needed.  Strep test is negative  Abdominal cramping, diarrhea and nausea due to viral gastroenteritis  Viral infection can cause fatigue.    Recommend symptomatic treatment with Imodium 2 mg as needed after each loose stool, maximum 16 mg per day  Drink more fluids    Zofran as needed for nausea    BRAT diet: bananas, rice, fluids, toasts and apple sauce.     If symptoms don't improve recommend additional evaluation stool testing, liver function check, CBC, renal function    If you continue to have symptoms without improvement schedule lab appointment

## 2018-09-29 LAB
BACTERIA SPEC CULT: NORMAL
SPECIMEN SOURCE: NORMAL

## 2018-09-29 ASSESSMENT — ASTHMA QUESTIONNAIRES: ACT_TOTALSCORE: 22

## 2018-10-05 DIAGNOSIS — R53.83 FATIGUE, UNSPECIFIED TYPE: ICD-10-CM

## 2018-10-05 DIAGNOSIS — R10.84 ABDOMINAL PAIN, GENERALIZED: ICD-10-CM

## 2018-10-05 LAB
ALBUMIN SERPL-MCNC: 4.2 G/DL (ref 3.4–5)
ALP SERPL-CCNC: 49 U/L (ref 40–150)
ALT SERPL W P-5'-P-CCNC: 31 U/L (ref 0–70)
ANION GAP SERPL CALCULATED.3IONS-SCNC: 7 MMOL/L (ref 3–14)
AST SERPL W P-5'-P-CCNC: 19 U/L (ref 0–45)
BASOPHILS # BLD AUTO: 0 10E9/L (ref 0–0.2)
BASOPHILS NFR BLD AUTO: 0.6 %
BILIRUB SERPL-MCNC: 0.4 MG/DL (ref 0.2–1.3)
BUN SERPL-MCNC: 7 MG/DL (ref 7–30)
CALCIUM SERPL-MCNC: 8.9 MG/DL (ref 8.5–10.1)
CHLORIDE SERPL-SCNC: 102 MMOL/L (ref 94–109)
CO2 SERPL-SCNC: 28 MMOL/L (ref 20–32)
CREAT SERPL-MCNC: 0.8 MG/DL (ref 0.66–1.25)
DIFFERENTIAL METHOD BLD: NORMAL
EOSINOPHIL # BLD AUTO: 0.3 10E9/L (ref 0–0.7)
EOSINOPHIL NFR BLD AUTO: 3.8 %
ERYTHROCYTE [DISTWIDTH] IN BLOOD BY AUTOMATED COUNT: 12.7 % (ref 10–15)
GFR SERPL CREATININE-BSD FRML MDRD: >90 ML/MIN/1.7M2
GLUCOSE SERPL-MCNC: 72 MG/DL (ref 70–99)
HCT VFR BLD AUTO: 42.1 % (ref 40–53)
HGB BLD-MCNC: 14.7 G/DL (ref 13.3–17.7)
LYMPHOCYTES # BLD AUTO: 1.7 10E9/L (ref 0.8–5.3)
LYMPHOCYTES NFR BLD AUTO: 25 %
MCH RBC QN AUTO: 32.6 PG (ref 26.5–33)
MCHC RBC AUTO-ENTMCNC: 34.9 G/DL (ref 31.5–36.5)
MCV RBC AUTO: 93 FL (ref 78–100)
MONOCYTES # BLD AUTO: 0.6 10E9/L (ref 0–1.3)
MONOCYTES NFR BLD AUTO: 8.9 %
NEUTROPHILS # BLD AUTO: 4.2 10E9/L (ref 1.6–8.3)
NEUTROPHILS NFR BLD AUTO: 61.7 %
PLATELET # BLD AUTO: 196 10E9/L (ref 150–450)
POTASSIUM SERPL-SCNC: 3.8 MMOL/L (ref 3.4–5.3)
PROT SERPL-MCNC: 7.2 G/DL (ref 6.8–8.8)
RBC # BLD AUTO: 4.51 10E12/L (ref 4.4–5.9)
SODIUM SERPL-SCNC: 137 MMOL/L (ref 133–144)
WBC # BLD AUTO: 6.8 10E9/L (ref 4–11)

## 2018-10-05 PROCEDURE — 80053 COMPREHEN METABOLIC PANEL: CPT | Performed by: NURSE PRACTITIONER

## 2018-10-05 PROCEDURE — 36415 COLL VENOUS BLD VENIPUNCTURE: CPT | Performed by: NURSE PRACTITIONER

## 2018-10-05 PROCEDURE — 85025 COMPLETE CBC W/AUTO DIFF WBC: CPT | Performed by: NURSE PRACTITIONER

## 2019-03-21 DIAGNOSIS — J45.40 MODERATE PERSISTENT ASTHMA WITHOUT COMPLICATION: ICD-10-CM

## 2019-03-21 NOTE — TELEPHONE ENCOUNTER
"Requested Prescriptions   Pending Prescriptions Disp Refills     albuterol (VENTOLIN HFA) 108 (90 Base) MCG/ACT inhaler [Pharmacy Med Name: VENTOLIN HFA 108MCG/ACT AERS]  Last Written Prescription Date:  9/28/2018  Last Fill Quantity: 1 inhaler,  # refills: 3   Last office visit: 9/28/2018 with prescribing provider:  Petros   Future Office Visit:     18 g 3     Sig: INHALE 2 PUFFS INTO THE LUNGS EVERY 6 HOURS AS NEEDED FOR SHORTNESS OF BREATH /DYSPNEA OR WHEEZING    Asthma Maintenance Inhalers - Anticholinergics Passed - 3/21/2019 11:21 AM       Passed - Patient is age 12 years or older       Passed - Asthma control assessment score within normal limits in last 6 months    Please review ACT score.          Passed - Medication is active on med list       Passed - Recent (6 mo) or future (30 days) visit within the authorizing provider's specialty    Patient had office visit in the last 6 months or has a visit in the next 30 days with authorizing provider or within the authorizing provider's specialty.  See \"Patient Info\" tab in inbasket, or \"Choose Columns\" in Meds & Orders section of the refill encounter.              "

## 2019-03-21 NOTE — LETTER
[facility]  [address] [facility] [address]  Phone: [phone]       March 22, 2019         Saúl Cedillo  [address] [place] [address]  [place] [address]            Dear Saúl:    We are concerned about your health care.  We recently provided you with medication refills.  Many medications require routine follow-up with your doctor.    Your prescription(s) have been refilled for 30 days so you may have time for the above noted follow-up. Please call to schedule soon so we can assure you have an appointment before your next refills are needed.    Thank you,      Carol Morrell NP  / larry

## 2019-03-22 RX ORDER — ALBUTEROL SULFATE 90 UG/1
AEROSOL, METERED RESPIRATORY (INHALATION)
Qty: 18 G | Refills: 0 | Status: SHIPPED | OUTPATIENT
Start: 2019-03-22 | End: 2020-11-25

## 2019-07-17 DIAGNOSIS — J45.40 MODERATE PERSISTENT ASTHMA WITHOUT COMPLICATION: ICD-10-CM

## 2019-07-17 NOTE — TELEPHONE ENCOUNTER
"Requested Prescriptions   Pending Prescriptions Disp Refills     albuterol (PROVENTIL) (2.5 MG/3ML) 0.083% neb solution 75 mL 0     Sig: Take 1 vial (2.5 mg) by nebulization every 6 hours as needed for shortness of breath / dyspnea or wheezing       Asthma Maintenance Inhalers - Anticholinergics Failed - 7/17/2019 11:00 AM        Failed - Asthma control assessment score within normal limits in last 6 months     Please review ACT score.           Failed - Recent (6 mo) or future (30 days) visit within the authorizing provider's specialty     Patient had office visit in the last 6 months or has a visit in the next 30 days with authorizing provider or within the authorizing provider's specialty.  See \"Patient Info\" tab in inbasket, or \"Choose Columns\" in Meds & Orders section of the refill encounter.            Passed - Patient is age 12 years or older        Passed - Medication is active on med list        Last Written Prescription Date:  3/22/19  Last Fill Quantity: 18,  # refills: 0   Last office visit: 9/28/2018 with prescribing provider:  Rene   Future Office Visit:   Next 5 appointments (look out 90 days)    Sep 03, 2019  3:20 PM CDT  SHORT with Phil Zayas MD  [facility] ([facility]) [address] [facility] [address] [phone]           "

## 2019-07-18 RX ORDER — ALBUTEROL SULFATE 0.83 MG/ML
2.5 SOLUTION RESPIRATORY (INHALATION) EVERY 6 HOURS PRN
Qty: 75 ML | Refills: 0 | Status: SHIPPED | OUTPATIENT
Start: 2019-07-18

## 2019-07-18 NOTE — TELEPHONE ENCOUNTER
Prescription approved per [facility] Refill Protocol.  He has appt 9-3-19.  Melisa BORGES RN

## 2020-08-22 ENCOUNTER — NURSE TRIAGE (OUTPATIENT)
Dept: NURSING | Facility: CLINIC | Age: 30
End: 2020-08-22

## 2020-08-22 NOTE — TELEPHONE ENCOUNTER
"Patient is calling.  Out of town camping, lost inhaler and don't have any refills left.  Reviewed chart and patient has not been seen at [facility] since early 2019. Reminded patient that the last place he got a albuterol inhaler was from [facility], per chart review, so he needed to contact them.      Melisa Ta RN on 8/22/2020 at 1:28 PM        Reason for Disposition    Caller requesting a NON-URGENT new prescription or refill and triager unable to refill per unit policy    Additional Information    Negative: Drug overdose and nurse unable to answer question    Negative: Caller requesting information not related to medicine    Negative: Caller requesting a prescription for Strep throat and has a positive culture result    Negative: Rash while taking a medication or within 3 days of stopping it    Negative: Immunization reaction suspected    Negative: [1] Asthma AND [2] having symptoms of asthma (cough, wheezing, etc)    Negative: MORE THAN A DOUBLE DOSE of a prescription or over-the-counter (OTC) drug    Negative: [1] DOUBLE DOSE (an extra dose or lesser amount) of over-the-counter (OTC) drug AND [2] any symptoms (e.g., dizziness, nausea, pain, sleepiness)    Negative: [1] DOUBLE DOSE (an extra dose or lesser amount) of prescription drug AND [2] any symptoms (e.g., dizziness, nausea, pain, sleepiness)    Negative: Took another person's prescription drug    Negative: [1] DOUBLE DOSE (an extra dose or lesser amount) of prescription drug AND [2] NO symptoms (Exception: a double dose of antibiotics)    Negative: Diabetes drug error or overdose (e.g., insulin or extra dose)    Negative: [1] Request for URGENT new prescription or refill of \"essential\" medication (i.e., likelihood of harm to patient if not taken) AND [2] triager unable to fill per unit policy    Negative: [1] Prescription not at pharmacy AND [2] was prescribed today by PCP    Negative: Pharmacy calling with prescription questions and " triager unable to answer question    Negative: Caller has urgent medication question about med that PCP prescribed and triager unable to answer question    Negative: Caller has NON-URGENT medication question about med that PCP prescribed and triager unable to answer question    Protocols used: MEDICATION QUESTION CALL-A-AH

## 2020-11-16 DIAGNOSIS — J45.40 MODERATE PERSISTENT ASTHMA WITHOUT COMPLICATION: ICD-10-CM

## 2020-11-16 RX ORDER — ALBUTEROL SULFATE 90 UG/1
AEROSOL, METERED RESPIRATORY (INHALATION)
Qty: 18 G | Refills: 0 | Status: CANCELLED | OUTPATIENT
Start: 2020-11-16

## 2020-11-17 NOTE — TELEPHONE ENCOUNTER
"Requested Prescriptions   Pending Prescriptions Disp Refills     albuterol (VENTOLIN HFA) 108 (90 Base) MCG/ACT inhaler 18 g 0     Sig: INHALE 2 PUFFS INTO THE LUNGS EVERY 6 HOURS AS NEEDED FOR SHORTNESS OF BREATH /DYSPNEA OR WHEEZING       Asthma Maintenance Inhalers - Anticholinergics Failed - 11/16/2020  8:21 PM        Failed - Asthma control assessment score within normal limits in last 6 months     Please review ACT score.           Failed - Recent (6 mo) or future (30 days) visit within the authorizing provider's specialty     Patient had office visit in the last 6 months or has a visit in the next 30 days with authorizing provider or within the authorizing provider's specialty.  See \"Patient Info\" tab in inbasket, or \"Choose Columns\" in Meds & Orders section of the refill encounter.            Passed - Patient is age 12 years or older        Passed - Medication is active on med list       Short-Acting Beta Agonist Inhalers Protocol  Failed - 11/16/2020  8:21 PM        Failed - Asthma control assessment score within normal limits in last 6 months     Please review ACT score.           Failed - Recent (6 mo) or future (30 days) visit within the authorizing provider's specialty     Patient had office visit in the last 6 months or has a visit in the next 30 days with authorizing provider or within the authorizing provider's specialty.  See \"Patient Info\" tab in inbasket, or \"Choose Columns\" in Meds & Orders section of the refill encounter.            Passed - Patient is age 12 or older        Passed - Medication is active on med list             "

## 2020-11-24 DIAGNOSIS — J45.40 MODERATE PERSISTENT ASTHMA WITHOUT COMPLICATION: ICD-10-CM

## 2020-11-25 RX ORDER — ALBUTEROL SULFATE 90 UG/1
AEROSOL, METERED RESPIRATORY (INHALATION)
Qty: 18 G | Refills: 0 | Status: SHIPPED | OUTPATIENT
Start: 2020-11-25

## 2020-11-25 NOTE — TELEPHONE ENCOUNTER
"Requested Prescriptions   Pending Prescriptions Disp Refills     albuterol (VENTOLIN HFA) 108 (90 Base) MCG/ACT inhaler 18 g 0     Sig: INHALE 2 PUFFS INTO THE LUNGS EVERY 6 HOURS AS NEEDED FOR SHORTNESS OF BREATH /DYSPNEA OR WHEEZING       Asthma Maintenance Inhalers - Anticholinergics Failed - 11/24/2020  7:23 PM        Failed - Asthma control assessment score within normal limits in last 6 months     Please review ACT score.           Failed - Recent (6 mo) or future (30 days) visit within the authorizing provider's specialty     Patient had office visit in the last 6 months or has a visit in the next 30 days with authorizing provider or within the authorizing provider's specialty.  See \"Patient Info\" tab in inbasket, or \"Choose Columns\" in Meds & Orders section of the refill encounter.            Passed - Patient is age 12 years or older        Passed - Medication is active on med list       Short-Acting Beta Agonist Inhalers Protocol  Failed - 11/24/2020  7:23 PM        Failed - Asthma control assessment score within normal limits in last 6 months     Please review ACT score.           Failed - Recent (6 mo) or future (30 days) visit within the authorizing provider's specialty     Patient had office visit in the last 6 months or has a visit in the next 30 days with authorizing provider or within the authorizing provider's specialty.  See \"Patient Info\" tab in inbasket, or \"Choose Columns\" in Meds & Orders section of the refill encounter.            Passed - Patient is age 12 or older        Passed - Medication is active on med list             "